# Patient Record
Sex: FEMALE | Race: BLACK OR AFRICAN AMERICAN | NOT HISPANIC OR LATINO
[De-identification: names, ages, dates, MRNs, and addresses within clinical notes are randomized per-mention and may not be internally consistent; named-entity substitution may affect disease eponyms.]

---

## 2023-01-24 ENCOUNTER — APPOINTMENT (OUTPATIENT)
Dept: PAIN MANAGEMENT | Facility: CLINIC | Age: 63
End: 2023-01-24
Payer: MEDICAID

## 2023-01-24 VITALS
DIASTOLIC BLOOD PRESSURE: 80 MMHG | HEIGHT: 68 IN | HEART RATE: 113 BPM | SYSTOLIC BLOOD PRESSURE: 115 MMHG | BODY MASS INDEX: 27.28 KG/M2 | WEIGHT: 180 LBS | OXYGEN SATURATION: 99 %

## 2023-01-24 DIAGNOSIS — J45.909 UNSPECIFIED ASTHMA, UNCOMPLICATED: ICD-10-CM

## 2023-01-24 DIAGNOSIS — M25.552 PAIN IN RIGHT HIP: ICD-10-CM

## 2023-01-24 DIAGNOSIS — M25.551 PAIN IN RIGHT HIP: ICD-10-CM

## 2023-01-24 DIAGNOSIS — Z87.39 PERSONAL HISTORY OF OTHER DISEASES OF THE MUSCULOSKELETAL SYSTEM AND CONNECTIVE TISSUE: ICD-10-CM

## 2023-01-24 DIAGNOSIS — M19.90 UNSPECIFIED OSTEOARTHRITIS, UNSPECIFIED SITE: ICD-10-CM

## 2023-01-24 PROCEDURE — 99204 OFFICE O/P NEW MOD 45 MIN: CPT

## 2023-01-24 RX ORDER — BUDESONIDE 32 UG/1
32 SPRAY, METERED NASAL
Refills: 0 | Status: ACTIVE | COMMUNITY

## 2023-01-24 RX ORDER — ESTRADIOL 10 UG/1
10 TABLET VAGINAL
Refills: 0 | Status: ACTIVE | COMMUNITY

## 2023-01-24 NOTE — HISTORY OF PRESENT ILLNESS
[FreeTextEntry1] : 62 yof presents w/ bilateral hip pain. She reports that her left side is worse then her right side. Quality of life is impaired. There has been a severe exacerbation of the patient's chronic pain. She has had a meniscal tear in the past, non-operative. Pain has been present for years. She reports that she has had back pain since fourth grade. Pain radiates in the groin and interior leg. NSAIDS help with the pain. No relief w/ extensive physical therapy.

## 2023-01-24 NOTE — PHYSICAL EXAM

## 2023-01-24 NOTE — CONSULT LETTER
[Dear  ___] : Dear  [unfilled], [Consult Letter:] : I had the pleasure of evaluating your patient, [unfilled]. [Please see my note below.] : Please see my note below. [Consult Closing:] : Thank you very much for allowing me to participate in the care of this patient.  If you have any questions, please do not hesitate to contact me. [Sincerely,] : Sincerely, [FreeTextEntry3] : German Mcintosh MD

## 2023-01-24 NOTE — ASSESSMENT
[FreeTextEntry1] : 62 yof presents w/ severe bilateral hip pain, left greater then right. Quality of life is impaired. There has been a severe exacerbation of the patient's chronic pain.\par \par I have personally reviewed the patient's xray in detail and discussed it with them which is significant for significant osteoarthritis in the hips.\par \par The patient has failed to have relief with medication management. The patient has failed to have relief with more then six weeks of physical therapy within the last three months. Given the patients failure to improve with all other conservative measures, recommend left hip intrarticular steroid injection under fluoroscopic guidance. The patient will follow-up with me in my office two weeks following intervention.\par \par I have discussed in detail with the patient that an interventional procedure is associated with potential risks. There is a chance that the procedure does not improve their pain. There are risks associated with the steroid being absorbed into the body systemically. These include dysphoria, difficulty sleeping, mood swings, and personality changes. Pre-menopausal women may notice a regularity his in her menstrual cycle for 2-3 months following the injection. Steroids can specifically affect patients with hypertension, diabetes, and peptic ulcers. The procedure may cause a temporary increase in blood pressure and blood glucose, and may adversely affect a peptic ulcer. Other, more rare complications, including avascular necrosis of the joints, glaucoma, and osteoporosis. I have discussed the risks of the procedure at length with the patient, and the potential benefits of pain relief. I have offered alternatives to the procedure. All questions were answered. The patient expressed understanding and wishes to proceed with the procedure.\par \par Physical therapy prescribed - goal will be to increase ROM, strengthening, postural training, other modalities ad theodore which may include massage and stim. Goals of therapy discussed with the patient in detail and will be discussed with physical therapist. Patient will follow-up following course of physical therapy to monitor progress and adjust therapy as needed.\par \par Acetaminophen 1,000 mg q8h prn for moderate pain. Risks, benefits, and alternatives of acetaminophen discussed with patient.\par \par Ibuprofen 600 mg q8h prn add when pain is not adequately controlled with acetaminophen. Risks, benefits, and alternatives of ibuprofen discussed with patient.\par \par Diet and nutritional strategies discussed which may improve patients pain and will improve overall health.\par \par \par

## 2023-02-23 ENCOUNTER — APPOINTMENT (OUTPATIENT)
Dept: PAIN MANAGEMENT | Facility: HOSPITAL | Age: 63
End: 2023-02-23